# Patient Record
Sex: FEMALE | Race: OTHER | HISPANIC OR LATINO | URBAN - METROPOLITAN AREA
[De-identification: names, ages, dates, MRNs, and addresses within clinical notes are randomized per-mention and may not be internally consistent; named-entity substitution may affect disease eponyms.]

---

## 2017-09-17 ENCOUNTER — EMERGENCY (EMERGENCY)
Facility: HOSPITAL | Age: 37
LOS: 1 days | Discharge: HOME | End: 2017-09-17
Attending: EMERGENCY MEDICINE | Admitting: EMERGENCY MEDICINE
Payer: COMMERCIAL

## 2017-09-17 VITALS
TEMPERATURE: 98 F | SYSTOLIC BLOOD PRESSURE: 120 MMHG | OXYGEN SATURATION: 98 % | RESPIRATION RATE: 18 BRPM | HEART RATE: 86 BPM | DIASTOLIC BLOOD PRESSURE: 79 MMHG

## 2017-09-17 VITALS
SYSTOLIC BLOOD PRESSURE: 115 MMHG | OXYGEN SATURATION: 97 % | DIASTOLIC BLOOD PRESSURE: 70 MMHG | TEMPERATURE: 98 F | WEIGHT: 179.9 LBS | RESPIRATION RATE: 16 BRPM | HEART RATE: 76 BPM

## 2017-09-17 DIAGNOSIS — M54.5 LOW BACK PAIN: ICD-10-CM

## 2017-09-17 DIAGNOSIS — R10.9 UNSPECIFIED ABDOMINAL PAIN: ICD-10-CM

## 2017-09-17 PROCEDURE — 99283 EMERGENCY DEPT VISIT LOW MDM: CPT | Mod: 25

## 2017-09-17 PROCEDURE — 81001 URINALYSIS AUTO W/SCOPE: CPT

## 2017-09-17 PROCEDURE — 99284 EMERGENCY DEPT VISIT MOD MDM: CPT

## 2017-09-17 PROCEDURE — 36415 COLL VENOUS BLD VENIPUNCTURE: CPT

## 2017-09-17 PROCEDURE — 87086 URINE CULTURE/COLONY COUNT: CPT

## 2017-09-17 RX ORDER — DIAZEPAM 5 MG
10 TABLET ORAL ONCE
Qty: 0 | Refills: 0 | Status: DISCONTINUED | OUTPATIENT
Start: 2017-09-17 | End: 2017-09-17

## 2017-09-17 RX ORDER — IBUPROFEN 200 MG
1 TABLET ORAL
Qty: 20 | Refills: 0
Start: 2017-09-17 | End: 2017-09-22

## 2017-09-17 RX ORDER — CYCLOBENZAPRINE HYDROCHLORIDE 10 MG/1
1 TABLET, FILM COATED ORAL
Qty: 15 | Refills: 0
Start: 2017-09-17 | End: 2017-09-22

## 2017-09-17 RX ORDER — IBUPROFEN 200 MG
600 TABLET ORAL ONCE
Qty: 0 | Refills: 0 | Status: COMPLETED | OUTPATIENT
Start: 2017-09-17 | End: 2017-09-17

## 2017-09-17 RX ADMIN — Medication 600 MILLIGRAM(S): at 21:44

## 2017-09-17 RX ADMIN — Medication 600 MILLIGRAM(S): at 21:17

## 2017-09-17 RX ADMIN — Medication 10 MILLIGRAM(S): at 21:17

## 2017-09-17 NOTE — ED PROVIDER NOTE - MEDICAL DECISION MAKING DETAILS
pain well controlled in ED, negative UA. given f/u with PMD, will c/w muscle relaxant and NSAIDS. given strict return precautions and anticipatory guidance. No signs of cauda equina/cord compression. No GI component.
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
## 2017-09-17 NOTE — ED PROVIDER NOTE - PHYSICAL EXAMINATION
VITAL SIGNS: I have reviewed nursing notes and confirm.  CONSTITUTIONAL: Well-developed; well-nourished female uncomfortable in chair though A&Ox3 speaking clearly in complete sentences; in no acute distress.  SKIN: Agree with RN documentation regarding decubitus evaluation. Remainder of skin exam is warm and dry, no acute rash.  HEAD: Normocephalic; atraumatic.  EYES: PERRL, EOM intact; conjunctiva and sclera clear.  ENT: No nasal discharge; airway clear.  NECK: Supple; non tender.  CARD: S1, S2 normal; no murmurs, gallops, or rubs. Regular rate and rhythm.  RESP: No wheezes, rales or rhonchi.  ABD: Normal bowel sounds; soft; non-distended; non-tender, no rigidity/guarding  BACK: + b/l paraspinal lumbar TTP w/out overlying skin changes/ecchymoses/erythema or induration, no midline TTP or cornelia abnormality  : No CVA TTP b/l  EXT: Normal ROM. No clubbing, cyanosis or edema.  LYMPH: No acute cervical adenopathy.  NEURO: Alert, oriented. Grossly unremarkable.  PSYCH: Cooperative, appropriate.

## 2018-05-02 ENCOUNTER — EMERGENCY (EMERGENCY)
Facility: HOSPITAL | Age: 38
LOS: 1 days | Discharge: HOME | End: 2018-05-02
Admitting: EMERGENCY MEDICINE
Payer: COMMERCIAL

## 2018-05-02 VITALS
OXYGEN SATURATION: 98 % | HEART RATE: 73 BPM | RESPIRATION RATE: 18 BRPM | DIASTOLIC BLOOD PRESSURE: 76 MMHG | WEIGHT: 182.98 LBS | SYSTOLIC BLOOD PRESSURE: 118 MMHG | TEMPERATURE: 98 F | HEIGHT: 66 IN

## 2018-05-02 DIAGNOSIS — Z79.1 LONG TERM (CURRENT) USE OF NON-STEROIDAL ANTI-INFLAMMATORIES (NSAID): ICD-10-CM

## 2018-05-02 DIAGNOSIS — N89.8 OTHER SPECIFIED NONINFLAMMATORY DISORDERS OF VAGINA: ICD-10-CM

## 2018-05-02 DIAGNOSIS — Z79.899 OTHER LONG TERM (CURRENT) DRUG THERAPY: ICD-10-CM

## 2018-05-02 DIAGNOSIS — N76.1 SUBACUTE AND CHRONIC VAGINITIS: ICD-10-CM

## 2018-05-02 LAB
HAV IGM SER-ACNC: SIGNIFICANT CHANGE UP
HBV CORE IGM SER-ACNC: SIGNIFICANT CHANGE UP
HBV SURFACE AG SER-ACNC: SIGNIFICANT CHANGE UP
HCV AB S/CO SERPL IA: 0.11 S/CO — SIGNIFICANT CHANGE UP
HCV AB SERPL-IMP: SIGNIFICANT CHANGE UP
HIV 1+2 AB+HIV1 P24 AG SERPL QL IA: SIGNIFICANT CHANGE UP

## 2018-05-02 PROCEDURE — 86780 TREPONEMA PALLIDUM: CPT

## 2018-05-02 PROCEDURE — 87389 HIV-1 AG W/HIV-1&-2 AB AG IA: CPT

## 2018-05-02 PROCEDURE — 99284 EMERGENCY DEPT VISIT MOD MDM: CPT

## 2018-05-02 PROCEDURE — 36415 COLL VENOUS BLD VENIPUNCTURE: CPT

## 2018-05-02 PROCEDURE — 80074 ACUTE HEPATITIS PANEL: CPT

## 2018-05-02 PROCEDURE — 86696 HERPES SIMPLEX TYPE 2 TEST: CPT

## 2018-05-02 RX ORDER — FLUCONAZOLE 150 MG/1
150 TABLET ORAL ONCE
Qty: 0 | Refills: 0 | Status: COMPLETED | OUTPATIENT
Start: 2018-05-02 | End: 2018-05-02

## 2018-05-02 RX ORDER — FLUCONAZOLE 150 MG/1
1 TABLET ORAL
Qty: 2 | Refills: 0
Start: 2018-05-02 | End: 2018-05-03

## 2018-05-02 RX ORDER — CLINDAMYCIN PHOSPHATE GEL USP, 1% 10 MG/G
1 GEL TOPICAL
Qty: 7 | Refills: 0
Start: 2018-05-02 | End: 2018-05-08

## 2018-05-02 RX ADMIN — FLUCONAZOLE 150 MILLIGRAM(S): 150 TABLET ORAL at 19:39

## 2018-05-02 NOTE — ED PROVIDER NOTE - GENITOURINARY, MLM
discolorations, edema, and pruritis to external genitalia, heavy malodorous vaginal discharge+, no CMT, no adnexal masses palpated, no uterine tenderness, few small sores? scratches? to left labia majora+, no vesicles,

## 2018-05-02 NOTE — ED PROVIDER NOTE - OBJECTIVE STATEMENT
36 yo female in the Er c/o vaginal discharge and itching x 3 weeks. Pt was taking OTC medications had slight improvement and then symptoms came back and even worse. Pt c/o odor, irritation, burning and pain to her genitalia. has same sex. partner and denies any h/o STD's , denies pelvic pain.

## 2018-05-02 NOTE — ED PROVIDER NOTE - MEDICAL DECISION MAKING DETAILS
overweight 38 yo female with chronic/recurrent  vaginitis x 3 weeks. gchlamydia cultures sent, pt took prophylactic treatment and will be d/keira home with BV/yeast vaginitis  treatment. GYN f/u recommended.  evaluation to r/o DM recommended. no clinical suspicion for PID

## 2018-05-03 LAB
HSV1 IGG SER-ACNC: 12.5 INDEX — HIGH
HSV1 IGG SERPL QL IA: POSITIVE
HSV2 IGG FLD-ACNC: 8.16 INDEX — HIGH
HSV2 IGG SERPL QL IA: POSITIVE
T PALLIDUM AB TITR SER: NEGATIVE — SIGNIFICANT CHANGE UP

## 2020-01-27 ENCOUNTER — EMERGENCY (EMERGENCY)
Facility: HOSPITAL | Age: 40
LOS: 1 days | Discharge: HOME | End: 2020-01-27
Admitting: EMERGENCY MEDICINE
Payer: COMMERCIAL

## 2020-01-27 VITALS
TEMPERATURE: 98 F | OXYGEN SATURATION: 99 % | DIASTOLIC BLOOD PRESSURE: 85 MMHG | RESPIRATION RATE: 16 BRPM | SYSTOLIC BLOOD PRESSURE: 129 MMHG | WEIGHT: 160.06 LBS | HEART RATE: 67 BPM

## 2020-01-27 PROCEDURE — 99284 EMERGENCY DEPT VISIT MOD MDM: CPT

## 2020-01-27 PROCEDURE — 99283 EMERGENCY DEPT VISIT LOW MDM: CPT

## 2020-01-27 RX ORDER — ACETAMINOPHEN 500 MG
2 TABLET ORAL
Qty: 40 | Refills: 0
Start: 2020-01-27 | End: 2020-01-31

## 2020-01-27 RX ORDER — LIDOCAINE 4 G/100G
10 CREAM TOPICAL ONCE
Refills: 0 | Status: COMPLETED | OUTPATIENT
Start: 2020-01-27 | End: 2020-01-27

## 2020-01-27 RX ORDER — ACETAMINOPHEN 500 MG
1000 TABLET ORAL ONCE
Refills: 0 | Status: COMPLETED | OUTPATIENT
Start: 2020-01-27 | End: 2020-01-27

## 2020-01-27 RX ADMIN — Medication 1000 MILLIGRAM(S): at 21:09

## 2020-01-27 RX ADMIN — Medication 1 TABLET(S): at 20:52

## 2020-01-27 RX ADMIN — Medication 1000 MILLIGRAM(S): at 20:52

## 2020-01-27 RX ADMIN — Medication 500 MILLIGRAM(S): at 21:09

## 2020-01-27 RX ADMIN — Medication 500 MILLIGRAM(S): at 20:52

## 2020-01-27 RX ADMIN — LIDOCAINE 10 MILLILITER(S): 4 CREAM TOPICAL at 20:51

## 2020-01-27 NOTE — ED PROVIDER NOTE - CLINICAL SUMMARY MEDICAL DECISION MAKING FREE TEXT BOX
40 y/o F pt presents to ED with lower dental pain s/p tooth extraction, taking Ibuprofen and Tylenol without relief. On PE, pt is well and non-toxic, approximately tooth 30 and tooth 18 extracted, no noted purulence or drainage, no gingival or mucosal irritation or fluctuance. Plan for pain meds, abx, and f/u with dentist. Pt agreeable with plan. 38 y/o F pt presents to ED with lower dental pain s/p tooth extraction, taking Ibuprofen and Tylenol without relief. On PE, pt is well and non-toxic, approximately tooth 30 and tooth 18 extracted, no noted purulence or drainage, no gingival or mucosal irritation or fluctuance. no evidence of ludwigs. Plan for pain meds, abx, and f/u with dentist. Pt agreeable with plan.

## 2020-01-27 NOTE — ED ADULT NURSE NOTE - OBJECTIVE STATEMENT
Patient c/o of toothache on lower jaw area s/p dental extraction 3 days ago, no swelling or fevers, no bleeding or discharge from site, states Tylenol not working.

## 2020-01-27 NOTE — ED PROVIDER NOTE - PHYSICAL EXAMINATION
General: Patient is well developed and well nourised. Patient is alert and oriented to person, place and date. Patient is laying comfortably in stretcher and appears in no acute distress.  HEENT: Head is normocephalic and atraumatic. Pupils are equal, round and reactive to light and accommodation. Extraocular movements intact. No evidence of nystagmus, conjunctival injection, or scleral icterus. External ears symmetric and non-tender without evidence of discharge. Ear canals are clear without evidence of edema or erythema. Cone of light evident on tympanic membrade without evidence of erythema, retraction or bulge. No hemotympanum present bilaterally.  Nose is symmetric, non-tender, patent without evidence of discharge. Teeth in good repair. Uvula midline. Pharynx without erythema, edema, tonsillar enlargement or exudates.   Neck: Supple and nontender, with no evidence of lymphadenopathy. No cervical spinal tenderness. Full range of motion.  Heart: Regular rate and rhythm. No murmurs, rubs or gallops.   Lungs: Clear to auscultation bilaterally with equal chest expansion. No note of wheezes, rhonchi, rales. Equal chest expansion. No note of retractions.  Abdomen: Bowel sounds present in all four quadrants. Soft, non-tender, non-distended without signs of masses, rebound or guarding. No note of hepatosplenomegaly. No CVA tenderness bilaterally. Negative Angulo sign. No pain present over McBurney's point.  Musculoskeletal: No edema, erythema, ecchymosis, atrophy or deformity. Full range of motion in all four extremities.  No clubbing or cyanosis. No point tenderness to palpation.   Neuro: Cranial nerves intact. GCS 15. Moving all extremities without discomfort. Sensation intact. Gait steady  Skin: Warm, dry and intact without evidence of rashes, bruising, pallor, jaundice or cyanosis.   Psych: Mood and affect appropriate.

## 2020-01-27 NOTE — ED ADULT NURSE REASSESSMENT NOTE - NS ED NURSE REASSESS COMMENT FT1
Medications adminsitered for toothache w/ good effects.  Vital signs stable.  Discharged to home in stable condition.

## 2020-01-27 NOTE — ED PROVIDER NOTE - OBJECTIVE STATEMENT
38 y/o F pt with no pertinent PMHx and PSHx presents to ED c/o dental pain s/p tooth extraction on 1/24/20. Per pt, she had 2 lower teeth extracted on 1/24, and had been unable to go to the dentist today because the office was closed. Pt relates taking Ibuprofen and Tylenol without relief, and being concerned for infection. Pt denies fever, chills, difficulty swallowing, and any other acute complaints.

## 2020-01-27 NOTE — ED ADULT TRIAGE NOTE - CHIEF COMPLAINT QUOTE
pt complaining of toothache and pain since extraction on Friday states she is taking Ibuprofen without relief no bleeding or drainage noted

## 2020-01-27 NOTE — ED PROVIDER NOTE - NSFOLLOWUPINSTRUCTIONS_ED_ALL_ED_FT
please take medications as prescribed    please follow up with dentist tomorrow    Dental Pain  Dental pain may be caused by many things, including:  Tooth decay (cavities or caries).Infection.The inner part of the tooth being filled with pus (abscess).Injury.Sometimes the cause of pain is unknown.  Your pain can vary. It may be mild or severe. You may have it all the time, or it may occur only when you are:  Chewing.Exposed to hot or cold temperature.Eating or drinking sugary foods or beverages, such as soda or candy.Follow these instructions at home:  Medicines     Take over-the-counter and prescription medicines only as told by your doctor.If you were prescribed an antibiotic medicine, take it as told by your doctor. Do not stop taking the medicine even if you start to feel better.Eating and drinking     Do not eat foods or drinks that cause you pain. These include:  Very hot or very cold foods or drinks.Sweet or sugary foods or drinks.Managing pain and swelling        Gargle with a salt-water mixture 3–4 times a day. To make this, dissolve ½–1 tsp of salt in 1 cup of warm water.If told, put ice on the painful area of your face:  Put ice in a plastic bag. Place a towel between your skin and the bag. Leave the ice on for 20 minutes, 2–3 times a day.Brushing your teeth     Brush your teeth twice a day using a fluoride toothpaste. Floss your teeth once a day.Use a toothpaste made for sensitive teeth as told by your doctor.Use a soft toothbrush.General instructions     Do not apply heat to the outside of your face.Watch your dental pain. Let your doctor know if there are any changes.Keep all follow-up visits as told by your doctor. This is important.Contact a doctor if:  Your pain is not relieved by medicines.You have new symptoms.Your symptoms get worse.Get help right away if:  You cannot open your mouth.You are having trouble breathing or swallowing.You have a fever.Your face, neck, or jaw is swollen.Summary  Dental pain may be caused by many things, including tooth decay, injury, or infection. In some cases, the cause is not known.Your pain may be mild or severe. You may have pain all the time, or you may have it only when you eat or drink.Take over-the-counter and prescription medicines only as told by your doctor.Watch your dental pain for any changes. Let your doctor know if symptoms get worse.This information is not intended to replace advice given to you by your health care provider. Make sure you discuss any questions you have with your health care provider.    Document Released: 06/05/2009 Document Revised: 12/31/2018 Document Reviewed: 12/31/2018  [employer] Patient Education © 2019 [employer]

## 2020-01-27 NOTE — ED PROVIDER NOTE - PATIENT PORTAL LINK FT
You can access the FollowMyHealth Patient Portal offered by [facility] by registering at the following website: [email or web address]. By joining [employer]’s FollowMyHealth portal, you will also be able to view your health information using other applications (apps) compatible with our system.

## 2020-02-02 DIAGNOSIS — K08.89 OTHER SPECIFIED DISORDERS OF TEETH AND SUPPORTING STRUCTURES: ICD-10-CM

## 2020-10-16 NOTE — ED ADULT NURSE NOTE - OBJECTIVE STATEMENT
Initiate Treatment: TAC 0.1% apply to affected area twice daily for 2 weeks.\\nHibiclens twice weekly in shower Detail Level: Simple 37 y/o female presenting to ER lower back pain and abdomen for 3 days, pt denies any injury or recent falls, no burning sensation or blood in the urine. Urine sample sent to lab, pending results. Will continue to monitor.

## 2020-12-22 ENCOUNTER — EMERGENCY (EMERGENCY)
Facility: HOSPITAL | Age: 40
LOS: 1 days | Discharge: HOME | End: 2020-12-22
Admitting: EMERGENCY MEDICINE
Payer: MEDICARE

## 2020-12-22 VITALS
HEIGHT: 66 IN | TEMPERATURE: 98 F | DIASTOLIC BLOOD PRESSURE: 80 MMHG | RESPIRATION RATE: 18 BRPM | WEIGHT: 179.9 LBS | HEART RATE: 79 BPM | OXYGEN SATURATION: 100 % | SYSTOLIC BLOOD PRESSURE: 127 MMHG

## 2020-12-22 PROCEDURE — 96372 THER/PROPH/DIAG INJ SC/IM: CPT

## 2020-12-22 PROCEDURE — 99283 EMERGENCY DEPT VISIT LOW MDM: CPT | Mod: 25

## 2020-12-22 PROCEDURE — 99284 EMERGENCY DEPT VISIT MOD MDM: CPT

## 2020-12-22 RX ORDER — DEXAMETHASONE 0.5 MG/5ML
10 ELIXIR ORAL ONCE
Refills: 0 | Status: COMPLETED | OUTPATIENT
Start: 2020-12-22 | End: 2020-12-22

## 2020-12-22 RX ORDER — AMOXICILLIN 250 MG/5ML
500 SUSPENSION, RECONSTITUTED, ORAL (ML) ORAL ONCE
Refills: 0 | Status: COMPLETED | OUTPATIENT
Start: 2020-12-22 | End: 2020-12-22

## 2020-12-22 RX ORDER — IBUPROFEN 200 MG
1 TABLET ORAL
Qty: 30 | Refills: 0
Start: 2020-12-22

## 2020-12-22 RX ORDER — AMOXICILLIN 250 MG/5ML
1 SUSPENSION, RECONSTITUTED, ORAL (ML) ORAL
Qty: 30 | Refills: 0
Start: 2020-12-22 | End: 2020-12-31

## 2020-12-22 RX ADMIN — Medication 500 MILLIGRAM(S): at 18:42

## 2020-12-22 RX ADMIN — Medication 10 MILLIGRAM(S): at 18:42

## 2020-12-22 NOTE — ED PROVIDER NOTE - PATIENT PORTAL LINK FT
You can access the FollowMyHealth Patient Portal offered by [facility] by registering at the following website: [email or web address]. By joining [employer]’s FollowMyHealth portal, you will also be able to view your health information using other applications (apps) compatible with our system.

## 2020-12-22 NOTE — ED PROVIDER NOTE - CLINICAL SUMMARY MEDICAL DECISION MAKING FREE TEXT BOX
40 y/o F w/ no PMHx presents to the ED c/o sore throat x 3 days. Pt w/ tonsillar edema on exam, w/ significant pain w/ swallowing; no signs of abscess, tolerating her own secretions. Will treat w/ amoxicillin. pt also given decadron in the ED for symptomatic improvement.

## 2020-12-22 NOTE — ED ADULT NURSE NOTE - NSIMPLEMENTINTERV_GEN_ALL_ED
Implemented All Universal Safety Interventions:  [place] to call system. Call bell, personal items and telephone within reach. Instruct patient to call for assistance. Room bathroom lighting operational. Non-slip footwear when patient is off stretcher. Physically safe environment: no spills, clutter or unnecessary equipment. Stretcher in lowest position, wheels locked, appropriate side rails in place.

## 2020-12-22 NOTE — ED PROVIDER NOTE - ENMT, MLM
Airway patent, Nasal mucosa clear. Pharynx mucosa erythematous, 1+ tonsillar edema b/l, no exudates, uvula midline, TMs intact b/l w/ no erythema or bulging.

## 2020-12-22 NOTE — ED PROVIDER NOTE - MUSCULOSKELETAL, MLM
direct patient care (not related to procedure)
Spine appears normal, range of motion is not limited, no muscle or joint tenderness

## 2020-12-22 NOTE — ED ADULT NURSE NOTE - OBJECTIVE STATEMENT
Pt presents to ED c/o sore throat x 3 days. Reports sore throat, difficultly swallowing, ear pain. Denies fevers, chills, cp, sob, dizziness, lightheadedness.

## 2020-12-22 NOTE — ED PROVIDER NOTE - OBJECTIVE STATEMENT
38 y/o F with no PMHx presents to the ED c/o sore throat for the past 3 days, as well as difficulty swallowing. Pt also has discomfort to both eyes. Denies fever, chills, or cough. 40 y/o F with no PMHx presents to the ED c/o sore throat for the past 3 days, as well as difficulty swallowing. Pt also has discomfort to both ears. Denies fever, chills, or cough.

## 2020-12-22 NOTE — ED ADULT TRIAGE NOTE - CHIEF COMPLAINT QUOTE
Pt c/o sore throat x 3 days associated w/ bilateral ear pain. Pt denies fever, chills, CP, SOB, NVD, cough.

## 2020-12-23 PROBLEM — Z78.9 OTHER SPECIFIED HEALTH STATUS: Chronic | Status: ACTIVE | Noted: 2020-01-27

## 2020-12-26 DIAGNOSIS — J02.9 ACUTE PHARYNGITIS, UNSPECIFIED: ICD-10-CM

## 2020-12-26 DIAGNOSIS — H92.03 OTALGIA, BILATERAL: ICD-10-CM

## 2021-01-16 ENCOUNTER — EMERGENCY (EMERGENCY)
Facility: HOSPITAL | Age: 41
LOS: 1 days | Discharge: HOME | End: 2021-01-16
Admitting: EMERGENCY MEDICINE
Payer: MEDICARE

## 2021-01-16 VITALS
DIASTOLIC BLOOD PRESSURE: 91 MMHG | OXYGEN SATURATION: 100 % | HEIGHT: 66 IN | SYSTOLIC BLOOD PRESSURE: 142 MMHG | RESPIRATION RATE: 20 BRPM | HEART RATE: 76 BPM | TEMPERATURE: 98 F | WEIGHT: 182.98 LBS

## 2021-01-16 DIAGNOSIS — R42 DIZZINESS AND GIDDINESS: ICD-10-CM

## 2021-01-16 DIAGNOSIS — Z79.2 LONG TERM (CURRENT) USE OF ANTIBIOTICS: ICD-10-CM

## 2021-01-16 DIAGNOSIS — R04.0 EPISTAXIS: ICD-10-CM

## 2021-01-16 DIAGNOSIS — J01.90 ACUTE SINUSITIS, UNSPECIFIED: ICD-10-CM

## 2021-01-16 DIAGNOSIS — Z79.1 LONG TERM (CURRENT) USE OF NON-STEROIDAL ANTI-INFLAMMATORIES (NSAID): ICD-10-CM

## 2021-01-16 DIAGNOSIS — Z79.899 OTHER LONG TERM (CURRENT) DRUG THERAPY: ICD-10-CM

## 2021-01-16 PROCEDURE — 99284 EMERGENCY DEPT VISIT MOD MDM: CPT

## 2021-01-16 PROCEDURE — 99283 EMERGENCY DEPT VISIT LOW MDM: CPT

## 2021-01-16 RX ORDER — SODIUM CHLORIDE 0.65 %
1 AEROSOL, SPRAY (ML) NASAL
Qty: 120 | Refills: 0
Start: 2021-01-16 | End: 2021-02-14

## 2021-01-16 RX ORDER — CETIRIZINE HYDROCHLORIDE, PSEUDOEPHEDRINE HYDROCHLORIDE 5; 120 MG/1; MG/1
1 TABLET, FILM COATED, EXTENDED RELEASE ORAL
Qty: 20 | Refills: 0
Start: 2021-01-16 | End: 2021-01-25

## 2021-01-16 RX ORDER — PSEUDOEPHEDRINE HCL 30 MG
30 TABLET ORAL ONCE
Refills: 0 | Status: COMPLETED | OUTPATIENT
Start: 2021-01-16 | End: 2021-01-16

## 2021-01-16 RX ADMIN — Medication 1 TABLET(S): at 19:53

## 2021-01-16 RX ADMIN — Medication 30 MILLIGRAM(S): at 19:53

## 2021-01-16 NOTE — ED ADULT TRIAGE NOTE - MODE OF ARRIVAL
29 - Imidazolidinyl Urea: no reaction Detail Level: Zone Show Allergen Counseling In The Note?: No Show Negative Results In The Note?: Yes What Reading Time Point?: 48 hour Number Of Patches Read: 36 Walk in [employer]

## 2021-01-16 NOTE — ED PROVIDER NOTE - CLINICAL SUMMARY MEDICAL DECISION MAKING FREE TEXT BOX
Patient with s/s of ac sinusitis afebrile in ED with right sided facial pain on exam. Will tx for ac sinusitis with Augmentin, lubricating nasal gel, and antihistamine with decongestant. Recommend air humidifier, ENT and hydration. Pt well appearing, nontoxic, and VSS.

## 2021-01-16 NOTE — ED PROVIDER NOTE - OBJECTIVE STATEMENT
39 y/o F with no PMHx, states she was covid positive in 9/2020 and had a recent repeat test that was negative, presents to the ED stating over the past few days she has had R sided facial tenderness on her forehead and by her sinuses, with congestion and a sensation of clogged ears. States when she blows her nose she tends to have blood streaked mucus and her nose tends to bleed. Pt saw her PMD who put her on 2 different nasal sprays that pt does not remember the names of. Denies any fever, cough, chest pain, SOB. Does report intermittent dizziness due to her clogged ears, and feels sometimes her sense of taste may be altered.

## 2021-01-16 NOTE — ED PROVIDER NOTE - CARE PROVIDER_API CALL
Britney Mc)  Otolaryngology  [address], [address] [place] [address]  Phone: [phone]  Fax: [phone]  Follow Up Time:     Katrin Byrne  OTOLARYNGOLOGY  [address] [address]  Phone: [phone]  Fax: [phone]  Follow Up Time:

## 2021-01-16 NOTE — ED ADULT TRIAGE NOTE - CHIEF COMPLAINT QUOTE
Patient came nose bleeding with pain since yesterday . No active bleeding at this time . Not on blood thinner .

## 2021-01-16 NOTE — ED ADULT NURSE NOTE - OBJECTIVE STATEMENT
Pt is a 41 y/o female A&Ox4 in NAD ambulatory with steady gait c/o epistaxis and sinus discomfort x 1 days. Pt denies fever/chills. Pt talking in clear, full sentences, respirations even and unlabored, denies blood thinners.

## 2021-01-16 NOTE — ED PROVIDER NOTE - NSFOLLOWUPINSTRUCTIONS_ED_ALL_ED_FT
SINUSITIS - AfterCare(R) Instructions(ER/ED)           Sinusitis    WHAT YOU NEED TO KNOW:    Sinusitis is inflammation or infection of your sinuses. It is most often caused by a virus. Acute sinusitis may last up to 12 weeks. Chronic sinusitis lasts longer than 12 weeks. Recurrent sinusitis means you have 4 or more times in 1 year.     Sinuses         DISCHARGE INSTRUCTIONS:    Return to the emergency department if:   •Your eye and eyelid are red, swollen, and painful.       •You cannot open your eye.       •You have vision changes, such as double vision.      •Your eyeball bulges out or you cannot move your eye.       •You are more sleepy than normal, or you notice changes in your ability to think, move, or talk.      •You have a stiff neck, a fever, or a bad headache.       •You have swelling of your forehead or scalp.      Contact your healthcare provider if:   •Your symptoms do not improve after 3 days.      •Your symptoms do not go away after 10 days.      •You have nausea and are vomiting.      •Your nose is bleeding.      •You have questions or concerns about your condition or care.      Medicines: Your symptoms may go away on their own. Your healthcare provider may recommend watchful waiting for up to 10 days before starting antibiotics. You may need any of the following:   •Acetaminophen decreases pain and fever. It is available without a doctor's order. Ask how much to take and how often to take it. Follow directions. Read the labels of all other medicines you are using to see if they also contain acetaminophen, or ask your doctor or pharmacist. Acetaminophen can cause liver damage if not taken correctly. Do not use more than 4 grams (4,000 milligrams) total of acetaminophen in one day.       •NSAIDs, such as ibuprofen, help decrease swelling, pain, and fever. This medicine is available with or without a doctor's order. NSAIDs can cause stomach bleeding or kidney problems in certain people. If you take blood thinner medicine, always ask your healthcare provider if NSAIDs are safe for you. Always read the medicine label and follow directions.      •Nasal steroid sprays may help decrease inflammation in your nose and sinuses.      •Decongestants help reduce swelling and drain mucus in the nose and sinuses. They may help you breathe easier.       •Antihistamines help dry mucus in the nose and relieve sneezing.       •Antibiotics help treat or prevent a bacterial infection.      •Take your medicine as directed. Contact your healthcare provider if you think your medicine is not helping or if you have side effects. Tell him or her if you are allergic to any medicine. Keep a list of the medicines, vitamins, and herbs you take. Include the amounts, and when and why you take them. Bring the list or the pill bottles to follow-up visits. Carry your medicine list with you in case of an emergency.      Self-care:   •Rinse your sinuses. Use a sinus rinse device to rinse your nasal passages with a saline (salt water) solution or distilled water. Do not use tap water. This will help thin the mucus in your nose and rinse away pollen and dirt. It will also help reduce swelling so you can breathe normally. Ask your healthcare provider how often to do this.       •Breathe in steam. Heat a bowl of water until you see steam. Lean over the bowl and make a tent over your head with a large towel. Breathe deeply for about 20 minutes. Be careful not to get too close to the steam or burn yourself. Do this 3 times a day. You can also breathe deeply when you take a hot shower.       •Sleep with your head elevated. Place an extra pillow under your head before you go to sleep to help your sinuses drain.       •Drink liquids as directed. Ask your healthcare provider how much liquid to drink each day and which liquids are best for you. Liquids will thin the mucus in your nose and help it drain. Avoid drinks that contain alcohol or caffeine.       •Do not smoke, and avoid secondhand smoke. Nicotine and other chemicals in cigarettes and cigars can make your symptoms worse. Ask your healthcare provider for information if you currently smoke and need help to quit. E-cigarettes or smokeless tobacco still contain nicotine. Talk to your healthcare provider before you use these products.       Prevent the spread of germs that cause sinusitis: Wash your hands often with soap and water. Wash your hands after you use the bathroom, change a child's diaper, or sneeze. Wash your hands before you prepare or eat food.     Handwashing         Follow up with your healthcare provider as directed: You may be referred to an ear, nose, and throat specialist. Write down your questions so you remember to ask them during your visits.        © Copyright "[employer] 2021           back to top                          © Copyright "[employer] 2021

## 2021-01-16 NOTE — ED PROVIDER NOTE - CARE PROVIDERS DIRECT ADDRESSES
[email or web address]

## 2021-01-16 NOTE — ED PROVIDER NOTE - ENMT, MLM
R frontal and maxillary sinus tenderness. Air fluid levels in the R TM. No signs of ear infection. Airway patent, no exudates. Boggy nasal turbinates b/l. No active nasal bleeding. Mouth with normal mucosa. Throat has no vesicles, no oropharyngeal exudates and uvula is midline.

## 2021-01-16 NOTE — ED PROVIDER NOTE - PATIENT PORTAL LINK FT
You can access the FollowMyHealth Patient Portal offered by [facility] by registering at the following website: [email or web address]. By joining [employer]’s FollowMyHealth portal, you will also be able to view your health information using other applications (apps) compatible with our system.

## 2021-01-20 PROBLEM — Z00.00 ENCOUNTER FOR PREVENTIVE HEALTH EXAMINATION: Status: ACTIVE | Noted: 2021-01-20

## 2021-01-22 ENCOUNTER — APPOINTMENT (OUTPATIENT)
Dept: OTOLARYNGOLOGY | Facility: CLINIC | Age: 41
End: 2021-01-22
Payer: MEDICARE

## 2021-01-22 VITALS
HEART RATE: 83 BPM | WEIGHT: 183 LBS | TEMPERATURE: 97.9 F | OXYGEN SATURATION: 97 % | SYSTOLIC BLOOD PRESSURE: 112 MMHG | HEIGHT: 66 IN | BODY MASS INDEX: 29.41 KG/M2 | DIASTOLIC BLOOD PRESSURE: 80 MMHG

## 2021-01-22 DIAGNOSIS — R09.81 NASAL CONGESTION: ICD-10-CM

## 2021-01-22 DIAGNOSIS — J34.2 DEVIATED NASAL SEPTUM: ICD-10-CM

## 2021-01-22 DIAGNOSIS — R04.0 EPISTAXIS: ICD-10-CM

## 2021-01-22 PROCEDURE — 31231 NASAL ENDOSCOPY DX: CPT

## 2021-01-22 PROCEDURE — 30901 CONTROL OF NOSEBLEED: CPT | Mod: 59

## 2021-01-22 PROCEDURE — 99072 ADDL SUPL MATRL&STAF TM PHE: CPT

## 2021-01-22 PROCEDURE — 99204 OFFICE O/P NEW MOD 45 MIN: CPT | Mod: 25

## 2021-01-22 NOTE — PHYSICAL EXAM
[Nasal Endoscopy Performed] : nasal endoscopy was performed, see procedure section for findings [] : septum deviated to the right [Midline] : trachea located in midline position [Normal] : no rashes [de-identified] : +area of prominent vessel, bleeding on right anterior septum [de-identified] : Nasal cavity appears to be dry in nature.

## 2021-01-22 NOTE — HISTORY OF PRESENT ILLNESS
[de-identified] : 41 yo female who presents with concern for nasal obstruction on the right.  She notes that these symptoms have been worse over the past months.  She will at times get drainage on the right side as well.  No facial pain, or dental pain.  No change in smell or taste.  She has had 2-3 episodes of epistaxis on the right as well.  These have mostly been self limited.  The congestion she finds is very discomforting and went to ED.  She was discharged with Augmentin and asked to follow up with ENT.  Overall she has had some improvement in symptoms.  No ENT issues otherwise.

## 2021-01-22 NOTE — REASON FOR VISIT
[Initial Consultation] : an initial consultation for [Nasal Obstruction] : nasal obstruction [Epistaxis] : epistaxis

## 2021-01-22 NOTE — CONSULT LETTER
[Dear  ___] : Dear  [unfilled], [Consult Letter:] : I had the pleasure of evaluating your patient, [unfilled]. [Please see my note below.] : Please see my note below. [Consult Closing:] : Thank you very much for allowing me to participate in the care of this patient.  If you have any questions, please do not hesitate to contact me. [Sincerely,] : Sincerely, [FreeTextEntry3] : Harrison Jacques MD\par Director; [facility]\par Otolaryngology - Head and Neck Surgery\par [place] [facility]\par \par \par Department of Otolaryngology\par [facility]\par \par [address]\par [facility], 10th Floor\[place], [place], [address]\par Office Tel: [phone]\par \par \par   \par \par \par

## 2021-01-22 NOTE — ASSESSMENT
[FreeTextEntry1] : 41 yo female presents with nasal congestion and epistaxis.  On exam there is some evidence of nasal dryness and an area of prominent vessels on right anterior septum as source of epistaxis.  today we elected to cauterize the septum and the patient tolerated this well.  We reviewed the epistaxis protocol including bacitracin, nasal saline, humidification.  I have asked her to finish the Augmentin as she already started this.  Follow up in 3 weeks if symptoms persist or worsen.\par \par - nasal cautery today\par - epistaxis protocol\par - fu 3 weeks.

## 2021-01-22 NOTE — PROCEDURE
[[email or web address]] : ----------------------------------------------------------------------------------\par Nasal Endoscopy\par Procedure Note\par   \par Pre-operative Diagnosis: nasal obstruction, epistaxis\par Post-operative Diagnosis: right septal deviation, epistaxis\par Anesthesia: Topical\par Procedure: Bilateral nasal endoscopy\par   \par Procedure Details: \par After topical anesthesia and decongestant, the patient was placed in the supine position. The telescope was passed along the left nasal floor to the nasopharynx. It was then passed into the region of the middle meatus, middle turbinate, and the sphenoethmoid region.  An identical procedure was performed on the right side. \par   \par Findings: \par Mucosa: 	                appears dry\par Nasal septum: 	deviated to the right, area on right ant with prominent vessels and dryness\par Discharge: 	none	\par Turbinates: 	normal	\par Adenoid: 	                normal	\par Posterior choanae: 	normal	\par Eustachian tubes: 	normal	\par Mucous stranding: 	normal 	\par Lesions: 	                Not present	\par   \par Comments: none\par Condition: Stable. Patient tolerated procedure well.\par Complications: None\par \par ----------------------------------------------------\par Nasal Cautery\par \par Pre Op Dx:  Epistaxis - right\par Post Op Dx:  Same\par Procedure:  Anterior Rhinoscopy and Nasal Cautery and Control of Epistaxis\par Surgeon:  Harrison Jacques MD\par Assistants:  None\par Anesthesia:  Topical\par Findings:  Prominent vessels anterior septum\par \par Indication:  Recurrent Epistaxis.  All risks, benefits, and alternatives were reviewed and consent was obtained.\par \par Procedure:  The patient was positioned in the upright position in the exam chair.  Anterior rhinoscopy was performed with a headlight and nasal speculum and both sides of the nose were examined and any debris removed.  Local anesthesia and decongestion was then performed 0.05% Oxymetazoline solution mixed with 2% Lidocaine solution placed along the septum in the affected nostril.  The vascular plexus on the affected septum was identified and exposed using a nasal speculum and headlight.  Silver nitrate sticks were used to cauterize the vascular plexus of the affected nostril.  All instruments were then removed from the nose and the patient was allowed to remain in the upright position for a few minutes while we observed for any evidence of bleeding.  There was no bleeding noted and hemostasis had been achieved.  Saline based gel was then applied to the nasal mucosa at the cautery site to form a protective barrier.  At this time the procedure was deemed satisfactory and complete.  Please see associated findings above.  \par \par EBL:  None\par Complications:  None\par Dispo:  Stable\par \par ----------------------------------------------------------------------------

## 2021-02-12 ENCOUNTER — APPOINTMENT (OUTPATIENT)
Dept: OTOLARYNGOLOGY | Facility: CLINIC | Age: 41
End: 2021-02-12

## 2021-06-07 NOTE — ED ADULT NURSE NOTE - NURSING NEURO LEVEL OF CONSCIOUSNESS
alert and awake/follows commands Purse String (Simple) Text: Given the location of the defect and the characteristics of the surrounding skin a purse string closure was deemed most appropriate.  Undermining was performed circumfirentially around the surgical defect.  A purse string suture was then placed and tightened.

## 2021-06-23 ENCOUNTER — EMERGENCY (EMERGENCY)
Facility: HOSPITAL | Age: 41
LOS: 1 days | Discharge: HOME | End: 2021-06-23
Attending: EMERGENCY MEDICINE | Admitting: EMERGENCY MEDICINE
Payer: MEDICARE

## 2021-06-23 VITALS
RESPIRATION RATE: 18 BRPM | HEART RATE: 87 BPM | OXYGEN SATURATION: 100 % | SYSTOLIC BLOOD PRESSURE: 128 MMHG | TEMPERATURE: 98 F | HEIGHT: 66 IN | DIASTOLIC BLOOD PRESSURE: 90 MMHG | WEIGHT: 199.96 LBS

## 2021-06-23 DIAGNOSIS — J30.2 OTHER SEASONAL ALLERGIC RHINITIS: ICD-10-CM

## 2021-06-23 DIAGNOSIS — H92.09 OTALGIA, UNSPECIFIED EAR: ICD-10-CM

## 2021-06-23 DIAGNOSIS — R09.81 NASAL CONGESTION: ICD-10-CM

## 2021-06-23 PROCEDURE — 99283 EMERGENCY DEPT VISIT LOW MDM: CPT

## 2021-06-23 RX ORDER — IBUPROFEN 200 MG
600 TABLET ORAL ONCE
Refills: 0 | Status: COMPLETED | OUTPATIENT
Start: 2021-06-23 | End: 2021-06-23

## 2021-06-23 RX ADMIN — Medication 600 MILLIGRAM(S): at 15:59

## 2021-06-23 NOTE — ED ADULT NURSE NOTE - NSIMPLEMENTINTERV_GEN_ALL_ED
Implemented All Universal Safety Interventions:  [place] to call system. Call bell, personal items and telephone within reach. Instruct patient to call for assistance. Room bathroom lighting operational. Non-slip footwear when patient is off stretcher. Physically safe environment: no spills, clutter or unnecessary equipment. Stretcher in lowest position, wheels locked, appropriate side rails in place.

## 2021-06-23 NOTE — ED PROVIDER NOTE - CONSTITUTIONAL, MLM
Continue original order of 0.0125% nightly-please change order in Epic.   normal... Well appearing, awake, alert, oriented to person, place, time/situation and in no apparent distress.

## 2021-06-23 NOTE — ED ADULT TRIAGE NOTE - HEIGHT IN FEET
5 Closure 3 Information: This tab is for additional flaps and grafts above and beyond our usual structured repairs.  Please note if you enter information here it will not currently bill and you will need to add the billing information manually.

## 2021-06-23 NOTE — ED PROVIDER NOTE - ENMT, MLM
Airway patent, Nasal mucosa clear. Mouth with normal mucosa. Throat has no vesicles, no oropharyngeal exudates and uvula is midline. No sinus tenderness.

## 2021-06-23 NOTE — ED PROVIDER NOTE - CLINICAL SUMMARY MEDICAL DECISION MAKING FREE TEXT BOX
41 y/o presents to ED w/ eye tearing, nasal congestion, ear pressure, throat itching. Patient was previously diagnosed w/ seasonal allergies, takes Claritin D w/ little relief. Most likely seasonal allergies, advised Flonase spray. Patient understands to return w/ any worsening symptoms including fevers, chills.

## 2021-06-23 NOTE — ED PROVIDER NOTE - PATIENT PORTAL LINK FT
You can access the FollowMyHealth Patient Portal offered by [facility] by registering at the following website: [email or web address]. By joining [facility]’s FollowMyHealth portal, you will also be able to view your health information using other applications (apps) compatible with our system.

## 2021-06-23 NOTE — ED PROVIDER NOTE - OBJECTIVE STATEMENT
39 y/o presents to ED w/ eye tearing, nasal congestion, ear pressure, throat itching. Patient was previously diagnosed w/ seasonal allergies, takes Claritin D w/ little relief. Denies fever, chills, cough, chest pain, SOB.

## 2021-06-23 NOTE — ED PROVIDER NOTE - NSFOLLOWUPINSTRUCTIONS_ED_ALL_ED_FT
Take flonase (over the counter) in addition to claritin-D as directed.    Follow up with your ENT doctor.    Return to ED with worsening symptoms - fever, chills, other condition that warrants antibiotics.        Allergic Rhinitis, Adult       Allergic rhinitis is an allergic reaction that affects the mucous membrane inside the nose. The mucous membrane is the tissue that produces mucus.  There are two types of allergic rhinitis:  •Seasonal. This type is also called hay fever and happens only during certain seasons.      •Perennial. This type can happen at any time of the year.      Allergic rhinitis cannot be spread from person to person. This condition can be mild, moderate, or severe. It can develop at any age and may be outgrown.      What are the causes?  This condition is caused by allergens. These are things that can cause an allergic reaction. Allergens may differ for seasonal allergic rhinitis and perennial allergic rhinitis.  •Seasonal allergic rhinitis is triggered by pollen. Pollen can come from grasses, trees, and weeds.    •Perennial allergic rhinitis may be triggered by:  •Dust mites.      •Proteins in a pet's urine, saliva, or dander. Dander is dead skin cells from a pet.      •Smoke, mold, or car fumes.          What increases the risk?  You are more likely to develop this condition if you have a family history of allergies or other conditions related to allergies, including:  •Allergic conjunctivitis. This is inflammation of parts of the eyes and eyelids.      •Asthma. This condition affects the lungs and makes it hard to breathe.      •Atopic dermatitis or eczema. This is long term (chronic) inflammation of the skin.      •Food allergies.        What are the signs or symptoms?  Symptoms of this condition include:  •Sneezing or coughing.      •A stuffy nose (nasal congestion), itchy nose, or nasal discharge.      •Itchy eyes and tearing of the eyes.      •A feeling of mucus dripping down the back of your throat (postnasal drip).      •Trouble sleeping.      •Tiredness or fatigue.      •Headache.      •Sore throat.        How is this diagnosed?  This condition may be diagnosed with your symptoms, medical history, and physical exam. Your health care provider may check for related conditions, such as:  •Asthma.      •Pink eye. This is eye inflammation caused by infection (conjunctivitis).      •Ear infection.      •Upper respiratory infection. This is an infection in the nose, throat, or upper airways.      You may also have tests to find out which allergens trigger your symptoms. These may include skin tests or blood tests.      How is this treated?  There is no cure for this condition, but treatment can help control symptoms. Treatment may include:  •Taking medicines that block allergy symptoms, such as corticosteroids and antihistamines. Medicine may be given as a shot, nasal spray, or pill.      •Avoiding any allergens.    •Being exposed again and again to tiny amounts of allergens to help you build a defense against allergens (immunotherapy). This is done if other treatments have not helped. It may include:  •Allergy shots. These are injected medicines that have small amounts of allergen in them.      •Sublingual immunotherapy. This involves taking small doses of a medicine with allergen in it under your tongue.        If these treatments do not work, your health care provider may prescribe newer, stronger medicines.      Follow these instructions at home:    Avoiding allergens   Find out what you are allergic to and avoid those allergens. These are some things you can do to help avoid allergens:•If you have perennial allergies:  •Replace carpet with wood, tile, or vinyl jenna. Carpet can trap dander and dust.      •Do not smoke. Do not allow smoking in your home.      •Change your heating and air conditioning filters at least once a month.      •If you have seasonal allergies, take these steps during allergy season:  •Keep windows closed as much as possible.      •Plan outdoor activities when pollen counts are lowest. Check pollen counts before you plan outdoor activities.      •When coming indoors, change clothing and shower before sitting on furniture or bedding.      •If you have a pet in the house that produces allergens:  •Keep the pet out of the bedroom.      •Vacuum, sweep, and dust regularly.        General instructions    •Take over-the-counter and prescription medicines only as told by your health care provider.      •Drink enough fluid to keep your urine pale yellow.      •Keep all follow-up visits as told by your health care provider. This is important.        Where to find more information    •American Academy of Allergy, Asthma & Immunology: www.aaaai.org        Contact a health care provider if:    •You have a fever.      •You develop a cough that does not go away.      •You make whistling sounds when you breathe (wheeze).      •Your symptoms slow you down or stop you from doing your normal activities each day.        Get help right away if:    •You have shortness of breath.      This symptom may represent a serious problem that is an emergency. Do not wait to see if the symptom will go away. Get medical help right away. Call your local emergency services (911 in the [place] Do not drive yourself to the hospital.       Summary    •Allergic rhinitis may be managed by taking medicines as directed and avoiding allergens.      •If you have seasonal allergies, keep windows closed as much as possible during allergy season.      •Contact your health care provider if you develop a fever or a cough that does not go away.      This information is not intended to replace advice given to you by your health care provider. Make sure you discuss any questions you have with your health care provider.      Document Revised: 02/05/2021 Document Reviewed: 12/15/2020    Elsevier Patient Education © 2021 [employer]

## 2021-06-23 NOTE — ED ADULT NURSE NOTE - OBJECTIVE STATEMENT
Patient AOX4 c/o "my allergies acting up" causing throat pain, watery eyes, and ear pain. Patient speaking in full, complete sentences. Denies fevers/chills.

## 2022-03-21 NOTE — ED ADULT NURSE NOTE - CAS TRG GEN SKIN COLOR
Diabetic pt here for Toenail trimming and foot care due to disease of the nail.       Previous Assessment:  Onychocryptosis  Onychodystrophy  Type 2 diabetes mellitus without complication, unspecified whether long term insulin use (CMS/[facility])  Comments:  saw PCP 10/29/2021  Pain in toes of both feet    Patient denies any other issues at this time.    Denies known Latex allergy or symptoms of Latex sensitivity.  Medications verified, no changes.  Allergies verified, no changes.   Tobacco use verified, no changes.   PCP verified, no changes.     Last saw PCP on 9/22/2021.     Normal for race

## 2022-05-05 NOTE — ED ADULT NURSE NOTE - PT NEEDS ASSIST
[facility]  [facility] [address]  Phone#  [phone]  Fax#  [phone]    Patient:  Neisha Borrero  YOB: 1981  Phone:  [phone]  Date of Visit:  5/5/2022    Orders Placed This Encounter   Procedures    Wound cleansing and dressings     Wound cleansing and dressings       Wound cleansing and dressings                                                              All wounds except right lateral hip:  Wash your hands with soap and water  Remove old dressing, discard into plastic bag and place in trash  Cleanse the wound with sterile saline solution (rinse) prior to applying a clean dressing  Do not use tissue or cotton balls  Do not scrub the wound  Pat dry using gauze  Shower no; do not get dressing wet     Apply saline moistened gauze  Cover with an ABD pad  Secure with Medfix tape    Change dressing daily and PRN for breakthrough drainage (visiting nurses to do twice per week and family in between)                           Right lateral hip:  Apply dermagran to wound bed   Cover with an ABD pad  Secure with medifix tape   Change dressing daily and PRN for breakthrough drainage (Visiting nurses to do twice per week and family in between)     Continue visiting nurses twice per week for dressing changes, family to do in between nurse visits     Follow up in 6 weeks                              Wound off loading  Continue clinitron bed at home and turn at least every 1-2 hours  To try and limit the amount of time spent sitting in the wheelchair  Keep pressure off the wounds as much as possible                            Continue to try and increase your protein to at least 3 servings or more if possible                                  Continue to try and keep blood sugars as low as possible  Stop Smoking        Treatment in the wound center today:  Cleansed with normal saline and dressed with Dakin's moistened gauze     Standing Status:   Future     Standing Expiration Date:   5/5/2023         Electronically signed by Dedrick Gibson MD no

## 2022-10-06 NOTE — ED ADULT NURSE NOTE - CAS ELECT INFOMATION PROVIDED
Medical Necessity Clause: This procedure was medically necessary because the lesions that were treated were: DC instructions

## 2022-11-12 ENCOUNTER — EMERGENCY (EMERGENCY)
Facility: HOSPITAL | Age: 42
LOS: 1 days | Discharge: HOME | End: 2022-11-12
Admitting: STUDENT IN AN ORGANIZED HEALTH CARE EDUCATION/TRAINING PROGRAM
Payer: MEDICARE

## 2022-11-12 VITALS
TEMPERATURE: 98 F | OXYGEN SATURATION: 99 % | DIASTOLIC BLOOD PRESSURE: 87 MMHG | HEART RATE: 74 BPM | RESPIRATION RATE: 16 BRPM | SYSTOLIC BLOOD PRESSURE: 128 MMHG

## 2022-11-12 PROCEDURE — 99283 EMERGENCY DEPT VISIT LOW MDM: CPT | Mod: 25

## 2022-11-12 PROCEDURE — 96372 THER/PROPH/DIAG INJ SC/IM: CPT

## 2022-11-12 PROCEDURE — 99284 EMERGENCY DEPT VISIT MOD MDM: CPT

## 2022-11-12 RX ORDER — KETOROLAC TROMETHAMINE 30 MG/ML
15 SYRINGE (ML) INJECTION ONCE
Refills: 0 | Status: DISCONTINUED | OUTPATIENT
Start: 2022-11-12 | End: 2022-11-12

## 2022-11-12 RX ADMIN — Medication 15 MILLIGRAM(S): at 21:23

## 2022-11-12 RX ADMIN — Medication 15 MILLIGRAM(S): at 21:19

## 2022-11-12 NOTE — ED PROVIDER NOTE - OBJECTIVE STATEMENT
41 F denies pmh p/w R foot pain x one week.  pt reports pain over bottom of R foot worse in the morning and w/ walking.  denies any injuries or skin changes.  taking tylenol w/o much improvement.  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  denies f/c, calf pain/swelling, numbness/weakness, paresthesia, or other concerns

## 2022-11-12 NOTE — ED PROVIDER NOTE - CLINICAL SUMMARY MEDICAL DECISION MAKING FREE TEXT BOX
41 F denies pmh p/w atraumatic R foot pain x one week.  on exam skin intact, no rash, RLE: + ttp over plantar fascia, no joint ttp, FROM all joints, SILT, cap refill < 2 sec, DP/PT pulse 2+, no calf ttp.  no indication for imaging.  likely plantar fascitis.  educated on supportive care and f/u with pmd/podiatry

## 2022-11-12 NOTE — ED PROVIDER NOTE - PATIENT PORTAL LINK FT
You can access the FollowMyHealth Patient Portal offered by [facility] by registering at the following website: [email or web address]. By joining [facility]’s FollowMyHealth portal, you will also be able to view your health information using other applications (apps) compatible with our system.

## 2022-11-12 NOTE — ED PROVIDER NOTE - CARE PROVIDER_API CALL
Gerson Wray (DPM)  Orthopaedic Surgery  [address], [address]  [place], [place] [address]  Phone: [phone]  Fax: [phone]  Follow Up Time:

## 2022-11-12 NOTE — ED PROVIDER NOTE - PHYSICAL EXAMINATION
Gen: well appearing, no acute distress  Skin: warm/dry, no rash noted  Resp: breathing comfortably, speaking in full sentences, no dyspnea  RLE: + ttp over plantar fascia, no joint ttp, FROM all joints, SILT, cap refill < 2 sec, DP/PT pulse 2+, no calf ttp  Neuro: alert/oriented, ambulatory

## 2022-11-14 DIAGNOSIS — M79.671 PAIN IN RIGHT FOOT: ICD-10-CM

## 2022-11-14 DIAGNOSIS — F17.200 NICOTINE DEPENDENCE, UNSPECIFIED, UNCOMPLICATED: ICD-10-CM

## 2024-01-08 NOTE — ED PROVIDER NOTE - PRINCIPAL DIAGNOSIS
If she would prefer to stay with once weekly injectable, we could try mounjaro.     Seasonal allergies

## 2024-05-02 NOTE — ED ADULT NURSE NOTE - CAS ELECT INFOMATION PROVIDED
May 3, 2024       Jodee Seaman MD  [address] [facility] [id number]  Via In Basket      Patient: Bethanie Weinstein   YOB: 1953   Date of Visit: 2024       Dear Dr. Seaman:    Thank you for referring Bethanie Weinstein to me for evaluation. Below are my notes for this visit with her.    If you have questions, please do not hesitate to call me. I look forward to following your patient along with you.      Sincerely,        Sonya Peck DO        CC: No Recipients  Sonya Peck DO  5/3/2024 11:57 AM  Signed    AM CARDIOLOGY PROGRESS NOTE      NAME: Bethanie Weinstein   AGE: 70 year old  MRN: [id number]   : 1953  DATE: 2024    PCP:Jodee Seaman MD     SUBJECTIVE:  Patient is a 70-year-old female presents for medically necessary follow-up visit for paroxysmal atrial fibrillation and DVT. Patient was recently admitted to the hospital. She reports that her PCP wanted her to come in to check and see if she needed some intervention for her legs. Patient reports that she had some bleeding in her stool on Eliquis and iron tablets . She reports orthopnea, but she doesn't regularly sleep on her back she sleeps in a chair. Patient denies any chest pain, palpitations, dizziness, or lightheadedness. No further complaints.    ROS:   Consitiutional ROS: no weight loss or gain  ENMT ROS: negative for - epistaxis, sore throat or vertigo  Respiratory ROS: no cough, shortness of breath, or wheezing  Cardiovascular ROS: negative for - chest pain, palpitations or shortness of breath  Gastrointestinal ROS: negative for - abdominal pain, blood in stools, change in bowel habits or nausea/vomiting  Genito-Urinary ROS: no dysuria, trouble voiding, or hematuria  Musculoskeletal ROS: negative for - joint pain or muscle pain  Neurological ROS: negative for - confusion or headaches  Dermatological ROS: negative for - rash or skin lesion changes  All other review of systems are  negative unless stated in HPI    OBJECTIVE:  BP: 130/70  HR: 80    PHYSICAL EXAM:  Constitutional: Alert, cooperative, no distress, appears stated age  ENMT: EOMI, Scerlae anicteric, MMM. No JVD, no LAD, no thyromegaly  Respiratory: Clear to ascultation bilaterally, no R/R/W  Cardiovascular: Regular rate and rhythm. S1, S2 normal. No M/R/G. Pulses 2+ and symmetric  Gastrointestinal: Soft, NT/ND. Bowel sounds normal. No masses, no organomegaly  Genitalia: Deferred  Musculoskeletal: Extremities normal, atraumatic. No cyanosis, clubbing, or edema  Osteopathic: Deferred   Psychiatric: Normal Affect  Skin: Skin color, texture, turgor normal. No rashes or lesions.  Neurologic: A&Ox3, CNII-XII grossly intact    Medication:    Current Outpatient Medications   Medication Sig Dispense Refill   • allopurinol (ZYLOPRIM) 300 MG tablet Take 1 tablet by mouth daily. 90 tablet 3   • albuterol 108 (90 Base) MCG/ACT inhaler Inhale 2 puffs into the lungs every 4 hours as needed for Shortness of Breath or Wheezing. 1 each 11   • apixaBAN (Eliquis) 5 MG Tab Take 1 tablet by mouth every 12 hours. 180 tablet 3   • furosemide (LASIX) 40 MG tablet Take 1 tablet by mouth daily. 30 tablet 11   • methIMAzole (TAPAZOLE) 5 MG tablet Take 1 tablet by mouth daily. 30 tablet 5   • metoPROLOL tartrate (LOPRESSOR) 25 MG tablet Take 0.5 tablets by mouth every 12 hours. TAKE 12.5 MG EVERY 12 HOURS PO 30 tablet 5   • potassium CHLORIDE (KLOR-CON M) 20 MEQ patsy ER tablet Take 1 tablet by mouth in the morning and 1 tablet in the evening. 60 tablet 5   • fluticasone-salmeterol 250-50 MCG/ACT inhaler Inhale 1 puff into the lungs in the morning and 1 puff in the evening. 1 each 11   • ketoconazole (NIZORAL) 2 % cream Apply topically daily as instructed. 90 g 3   • acetaminophen (TYLENOL) 500 MG tablet Take 2 tablets by mouth as needed for Pain. 60 tablet 3   • oxygen (O2) gas Inhale 3 L into the lungs continuous.     • dilTIAZem (CARDIZEM LA) 360 MG 24 hr  tablet Take 1 tablet by mouth daily. 90 tablet 3   • ipratropium-albuterol (DUONEB) 0.5-2.5 (3) MG/3ML nebulizer solution Take 3 mLs by nebulization 4 times daily.     • Multiple Vitamin (MULTIVITAMIN ADULT PO) Take by mouth daily.     • omeprazole (PrilOSEC) 20 MG capsule Take 20 mg by mouth daily.     • fluticasone (FLONASE) 50 MCG/ACT nasal spray Spray 2 sprays in each nostril daily as needed. Indications: Allergic Rhinitis, Stuffy Nose     • budesonide-formoterol (Symbicort) 160-4.5 MCG/ACT inhaler Inhale 2 puffs into the lungs in the morning and 2 puffs in the evening. 10.2 g 11   • FeroSul 325 (65 Fe) MG tablet TAKE ONE TABLET BY MOUTH ONE TIME DAILY WITH BREAKFAST 30 tablet 0     No current facility-administered medications for this visit.       Recent Labs:  Reviewed and assessed    Lab Results   Component Value Date    CREATININE 0.94 08/11/2023    CREATININE 0.96 (H) 11/04/2020    BUN 11 08/11/2023    BUN 10 11/04/2020    CO2 39 (H) 08/11/2023    CO2 29 11/04/2020       Lab Results   Component Value Date    WBC 6.0 08/11/2023    WBC 8.0 11/04/2020    HGB 10.6 (L) 08/11/2023    HGB 10.3 (L) 11/04/2020    HCT 39.0 08/11/2023    HCT 35.3 (L) 11/04/2020    MCV 96.1 08/11/2023    MCV 87.4 11/04/2020     08/11/2023     11/04/2020     No results found for: \"CKTOTAL\", \"CKMB\", \"TROPONINI\"    Lab Results   Component Value Date    HDL 77 08/11/2023    HDL 61 04/20/2022    HDL 65 07/17/2021    HDL 49 (L) 07/11/2020    HDL 61 12/21/2019    HDL 52 08/04/2018       Lab Results   Component Value Date    INR 1.1 06/17/2020    INR  06/17/2020     INR Therapeutic Range: 2.0 to 3.0 (2.5 to 3.5 recommended for recurrent thrombotic episodes and mechanical prosthetic heart valves.)    INR 1.1 06/13/2020    INR  06/13/2020     INR Therapeutic Range: 2.0 to 3.0 (2.5 to 3.5 recommended for recurrent thrombotic episodes and mechanical prosthetic heart valves.)       ECG Findings:  N/A    The 10-year ASCVD risk score  (Darshan STRONG, et al., 2019) is: 11.9%    Values used to calculate the score:      Age: 70 years      Sex: Female      Is Non- : Yes      Diabetic: No      Tobacco smoker: No      Systolic Blood Pressure: 138 mmHg      Is BP treated: No      HDL Cholesterol: 77 mg/dL      Total Cholesterol: 153 mg/dL    Impression:  -Paroxysmal atrial fibrillation, currently in sinus rhythm  -Status post unsuccessful A. fib ablation  -Right lower extremity DVT, on Eliquis  -Compensated diastolic congestive heart failure  -Essential hypertension, at goal  -Pulmonary hypertension  -Chronic hypoxic respiratory failure, on home oxygen  -COPD  -BMI 49.57 kg/m²  -H/o pericardial effusion with impending tamponade s/p pericardiocentesis in 7/2016  -CKD stage II, improving  -Hyperthyroidism    Plan:  -I reviewed the patient's labs and discussed the results with the patient  -I reviewed the patient's EKGs and discussed results with the patient  -I reviewed the patient's echocardiogram from February 2019 discussed results with the patient  -Echocardiogram results were discussed with the patient at length.  -I have reviewed the patient's medications.  I advised the patient to take her medications as prescribed  -I had advised the patient to follow up with our CHF clinic  -Patient advised to eat a heart healthy sodium restricted diet  -Check US Bilateral Lower Extremity Venous Duplex insufficiency  -Patient advised to wear compression stockings or Ace bandages  -Patient advised to exercise when possible    Follow-up in 3 months.      Thank You,  Sonya Peck DO  [facility]  5/2/2024  1:59 PM      Scribe Attestation: Entered by Caryl Lu, acting as scribe for Dr. Soyna Peck DO    Provider Attestation: The documentation recorded by the scribe accurately reflects the service I personally performed and the decisions made by me, Dr. Sonya Peck DO     DC instructions

## 2024-08-06 NOTE — ED PROVIDER NOTE - GASTROINTESTINAL, MLM
[facility]  PHYSICAL THERAPY  [] EVALUATION  [] DAILY NOTE (LAND) [] DAILY NOTE (AQUATIC ) [x] PROGRESS NOTE [] DISCHARGE NOTE    [] [facility] [place]   [x] [facility]    [] [facility]   [] [facility]    Date: 2024  Patient Name:  Dominique Pink  : 1957  MRN: [id number]  CSN: [id number]    Referring Practitioner Dillon Luther MD    Diagnosis Cervicalgia  Arthrodesis status   Treatment Diagnosis M54.2  Neck Pain  M54.2, G89.29  Chronic Neck Pain  R53.1 Weakness  M25.60 Stiffness of Unspecified Joint   Date of Evaluation 24    Additional Pertinent History  has a past medical history of Abdominal adhesions (2015), Anxiety, Arthralgia, Arthritis, Cervical radiculopathy at C5 (2015), Chronic abdominal pain, Diverticulitis, Fibromyalgia (2015), Hypercholesteremia (2022), IBS (irritable bowel syndrome), Irritable bowel disease, Other irritable bowel syndrome (2022), Primary osteoarthritis involving multiple joints (2019), and Tension headache (2015).   R THR .  Cervical fusion 16 years ago by Dr. Luther at C5-7 in       Functional Outcome Measure Used Neck disability scale   Functional Outcome Score Eval 22 (24) , PN 20- very limited improved.       Insurance: Primary: Payor: [employer] /  /  / ,   Secondary:    Authorization Information: [id number]              CSN:   PRIMARY INSURANCE COMPANY: [employer] [facility]   INSURANCE COMPANY REPRESENTATIVE:  \[facility]\""  INSURANCE [employer] TELEPHONE NUMBER:     INSURANCE COMPANY FAX NUMBER:    OUTPATIENT BENEFITS:               DEDUCTIBLE: $NA               OUT OF POCKET: $5300 MET $1486.17              INSURANCE PAYS AT: 100% after copay              PATIENT RESPONSIBILITY AND/OR CO-PAY: $40 copay per visit  SECONDARY INSURANCE COMPANY:        PRE CERTIFICATION REQUIRED: No precert required.  INSURANCE THERAPY BENEFIT:  Abdomen soft, non-tender, no guarding.

## 2025-03-17 NOTE — ED ADULT NURSE NOTE - CHIEF COMPLAINT
Endocrine refill protocol for medications for hypothyroidism and hyperthyroidism    Protocol Criteria:  PASSED Reason: N/A    If all below requirements are met, send a 90-day supply with 1 refill per provider protocol.    Verify appointment with Endocrinology completed in the last 12 months or scheduled in the next 6 months.    Normal TSH result in the past 12 months   Review recent telephone encounters and mychart communications with patient to ensure a dose change has not occurred since last office visit that was not updated in the medication history list     Last completed office visit:3/3/2025 Courtney Oliver DO   Next scheduled Follow up:   Future Appointments   Date Time Provider Department Center   3/18/2025  5:00 PM BBK US RM1 [facility] US [place]   3/25/2025 11:45 AM Maria Luisa Sunshine DO [facility] [address] [facility] [address]   7/7/2025  8:00 AM HOB DEXA RM1 HOB DEXA [place]   9/22/2025 10:00 AM Courtney Oliver DO [id number] [facility] [place]      Last TSH result:   TSH   Date Value Ref Range Status   02/24/2025 0.981 0.550 - 4.780 uIU/mL Final   09/22/2014 1.280 0.450 - 4.500 uIU/mL Final   06/05/2010 4.810 (H) 0.450 - 4.500 uIU/mL Final   02/23/2008 3.906 0.350 - 5.500 uIU/mL Final     Sent per protocol      The patient is a 39y Female complaining of sore throat.